# Patient Record
Sex: MALE | Race: WHITE | Employment: FULL TIME | URBAN - METROPOLITAN AREA
[De-identification: names, ages, dates, MRNs, and addresses within clinical notes are randomized per-mention and may not be internally consistent; named-entity substitution may affect disease eponyms.]

---

## 2020-02-10 ENCOUNTER — HOSPITAL ENCOUNTER (EMERGENCY)
Age: 25
Discharge: HOME | End: 2020-02-10
Attending: EMERGENCY MEDICINE
Payer: COMMERCIAL

## 2020-02-10 ENCOUNTER — APPOINTMENT (OUTPATIENT)
Dept: GENERAL RADIOLOGY | Age: 25
End: 2020-02-10
Payer: COMMERCIAL

## 2020-02-10 VITALS
TEMPERATURE: 98.1 F | WEIGHT: 178 LBS | RESPIRATION RATE: 16 BRPM | BODY MASS INDEX: 24.11 KG/M2 | DIASTOLIC BLOOD PRESSURE: 56 MMHG | HEART RATE: 52 BPM | HEIGHT: 72 IN | SYSTOLIC BLOOD PRESSURE: 100 MMHG | OXYGEN SATURATION: 99 %

## 2020-02-10 LAB
A/G RATIO: 1.8 (ref 1.1–2.2)
ALBUMIN SERPL-MCNC: 4.8 G/DL (ref 3.4–5)
ALP BLD-CCNC: 80 U/L (ref 40–129)
ALT SERPL-CCNC: 16 U/L (ref 10–40)
ANION GAP SERPL CALCULATED.3IONS-SCNC: 10 MMOL/L (ref 3–16)
AST SERPL-CCNC: 22 U/L (ref 15–37)
BASOPHILS ABSOLUTE: 0.1 K/UL (ref 0–0.2)
BASOPHILS RELATIVE PERCENT: 1.1 %
BILIRUB SERPL-MCNC: 0.8 MG/DL (ref 0–1)
BUN BLDV-MCNC: 13 MG/DL (ref 7–20)
CALCIUM SERPL-MCNC: 9.5 MG/DL (ref 8.3–10.6)
CHLORIDE BLD-SCNC: 103 MMOL/L (ref 99–110)
CO2: 27 MMOL/L (ref 21–32)
CREAT SERPL-MCNC: 0.8 MG/DL (ref 0.9–1.3)
EOSINOPHILS ABSOLUTE: 0.2 K/UL (ref 0–0.6)
EOSINOPHILS RELATIVE PERCENT: 2.1 %
GFR AFRICAN AMERICAN: >60
GFR NON-AFRICAN AMERICAN: >60
GLOBULIN: 2.6 G/DL
GLUCOSE BLD-MCNC: 112 MG/DL (ref 70–99)
HCT VFR BLD CALC: 48.1 % (ref 40.5–52.5)
HEMOGLOBIN: 16.6 G/DL (ref 13.5–17.5)
LYMPHOCYTES ABSOLUTE: 1.8 K/UL (ref 1–5.1)
LYMPHOCYTES RELATIVE PERCENT: 22.4 %
MAGNESIUM: 2.1 MG/DL (ref 1.8–2.4)
MCH RBC QN AUTO: 33.8 PG (ref 26–34)
MCHC RBC AUTO-ENTMCNC: 34.5 G/DL (ref 31–36)
MCV RBC AUTO: 97.9 FL (ref 80–100)
MONOCYTES ABSOLUTE: 0.7 K/UL (ref 0–1.3)
MONOCYTES RELATIVE PERCENT: 8.9 %
NEUTROPHILS ABSOLUTE: 5.1 K/UL (ref 1.7–7.7)
NEUTROPHILS RELATIVE PERCENT: 65.5 %
PDW BLD-RTO: 13.6 % (ref 12.4–15.4)
PLATELET # BLD: 199 K/UL (ref 135–450)
PMV BLD AUTO: 8.3 FL (ref 5–10.5)
POTASSIUM SERPL-SCNC: 4.6 MMOL/L (ref 3.5–5.1)
RBC # BLD: 4.91 M/UL (ref 4.2–5.9)
SODIUM BLD-SCNC: 140 MMOL/L (ref 136–145)
TOTAL PROTEIN: 7.4 G/DL (ref 6.4–8.2)
TROPONIN: <0.01 NG/ML
WBC # BLD: 7.8 K/UL (ref 4–11)

## 2020-02-10 PROCEDURE — 0296T PR EXT ECG > 48HR TO 21 DAY RCRD W/CONECT INTL RCRD: CPT | Performed by: INTERNAL MEDICINE

## 2020-02-10 PROCEDURE — 80053 COMPREHEN METABOLIC PANEL: CPT

## 2020-02-10 PROCEDURE — 71046 X-RAY EXAM CHEST 2 VIEWS: CPT

## 2020-02-10 PROCEDURE — 93005 ELECTROCARDIOGRAM TRACING: CPT | Performed by: EMERGENCY MEDICINE

## 2020-02-10 PROCEDURE — 85025 COMPLETE CBC W/AUTO DIFF WBC: CPT

## 2020-02-10 PROCEDURE — 83735 ASSAY OF MAGNESIUM: CPT

## 2020-02-10 PROCEDURE — 84484 ASSAY OF TROPONIN QUANT: CPT

## 2020-02-10 PROCEDURE — 99284 EMERGENCY DEPT VISIT MOD MDM: CPT

## 2020-02-10 ASSESSMENT — ENCOUNTER SYMPTOMS
VOMITING: 0
SORE THROAT: 0
ABDOMINAL PAIN: 0
SHORTNESS OF BREATH: 0
CONSTIPATION: 0
COUGH: 0
DIARRHEA: 0
BACK PAIN: 0
NAUSEA: 0

## 2020-02-10 NOTE — ED PROVIDER NOTES
breath. Cardiovascular: Positive for palpitations. Negative for chest pain. Gastrointestinal: Negative for abdominal pain, constipation, diarrhea, nausea and vomiting. Genitourinary: Negative for dysuria and hematuria. Musculoskeletal: Negative for back pain and neck pain. Skin: Negative for pallor and rash. Neurological: Positive for light-headedness. Negative for headaches. All other systems reviewed and are negative. Except as noted above the remainder of the review of systems was reviewed and negative. PAST MEDICAL HISTORY     Past Medical History:   Diagnosis Date    Asthma          SURGICAL HISTORY       Past Surgical History:   Procedure Laterality Date    FRACTURE SURGERY Right 09/09/2016    ORIF RIGHT HAND         CURRENT MEDICATIONS       There are no discharge medications for this patient. ALLERGIES     Patient has no known allergies. FAMILY HISTORY     History reviewed. No pertinent family history.        SOCIAL HISTORY       Social History     Socioeconomic History    Marital status: Single     Spouse name: None    Number of children: None    Years of education: None    Highest education level: None   Occupational History    None   Social Needs    Financial resource strain: None    Food insecurity:     Worry: None     Inability: None    Transportation needs:     Medical: None     Non-medical: None   Tobacco Use    Smoking status: Current Every Day Smoker     Packs/day: 0.50   Substance and Sexual Activity    Alcohol use: No    Drug use: Yes     Types: Marijuana    Sexual activity: None   Lifestyle    Physical activity:     Days per week: None     Minutes per session: None    Stress: None   Relationships    Social connections:     Talks on phone: None     Gets together: None     Attends Judaism service: None     Active member of club or organization: None     Attends meetings of clubs or organizations: None     Relationship status: None    Intimate partner violence:     Fear of current or ex partner: None     Emotionally abused: None     Physically abused: None     Forced sexual activity: None   Other Topics Concern    None   Social History Narrative    None       SCREENINGS               PHYSICAL EXAM    (up to 7 for level 4, 8 or more for level 5)     ED Triage Vitals [02/10/20 0837]   BP Temp Temp Source Pulse Resp SpO2 Height Weight   (!) 137/98 98.1 °F (36.7 °C) Oral 70 16 99 % 6' (1.829 m) 178 lb (80.7 kg)       Physical Exam  Vitals signs and nursing note reviewed. Constitutional:       General: He is not in acute distress. Appearance: Normal appearance. HENT:      Head: Normocephalic and atraumatic. Nose: Nose normal. No congestion. Mouth/Throat:      Mouth: Mucous membranes are moist.   Eyes:      Conjunctiva/sclera: Conjunctivae normal.   Neck:      Musculoskeletal: Normal range of motion. Cardiovascular:      Rate and Rhythm: Normal rate and regular rhythm. Pulses: Normal pulses. Heart sounds: Normal heart sounds. No murmur. Pulmonary:      Effort: Pulmonary effort is normal. No respiratory distress. Breath sounds: Normal breath sounds. Abdominal:      General: There is no distension. Palpations: Abdomen is soft. Tenderness: There is no abdominal tenderness. Musculoskeletal: Normal range of motion. General: No swelling or deformity. Skin:     General: Skin is warm and dry. Neurological:      General: No focal deficit present. Mental Status: He is alert and oriented to person, place, and time. DIAGNOSTIC RESULTS     EKG: All EKG's are interpreted by the Emergency Department Physician who either signs or Co-signs this chart in the absence of a cardiologist.    Sinus bradycardia, rate 53, normal intervals, no STEMI, delta wave present concerning for Konstantin-Parkinson-White, previous EKG from September 19, 2013 does not show delta wave.     RADIOLOGY:     Interpretation per the
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
## 2020-02-10 NOTE — ED NOTES
[facility] cardiology consult @0854  Re: black out episode, concern for WPW per [email or web address] 0798 Elke  02/10/20 113

## 2020-02-11 LAB
EKG ATRIAL RATE: 53 BPM
EKG DIAGNOSIS: NORMAL
EKG P AXIS: 55 DEGREES
EKG P-R INTERVAL: 136 MS
EKG Q-T INTERVAL: 406 MS
EKG QRS DURATION: 112 MS
EKG QTC CALCULATION (BAZETT): 380 MS
EKG R AXIS: 3 DEGREES
EKG T AXIS: 59 DEGREES
EKG VENTRICULAR RATE: 53 BPM

## 2020-02-11 PROCEDURE — 93010 ELECTROCARDIOGRAM REPORT: CPT | Performed by: INTERNAL MEDICINE

## 2020-02-17 ENCOUNTER — NURSE ONLY (OUTPATIENT)
Dept: CARDIOLOGY CLINIC | Age: 25
End: 2020-02-17

## 2020-03-04 PROCEDURE — 0298T PR EXT ECG > 48HR TO 21 DAY REVIEW AND INTERPRETATN: CPT | Performed by: INTERNAL MEDICINE

## 2020-03-05 ENCOUNTER — TELEPHONE (OUTPATIENT)
Dept: CARDIOLOGY CLINIC | Age: 25
End: 2020-03-05

## 2020-03-05 NOTE — TELEPHONE ENCOUNTER
2/10/20  Patient discussed with ER physician.  EKG and records reviewed     2 week monitor and follow up with me in office in 4 weeks     PEÑA Cheema Do you want him to make apt with you, Daryl Hoyos, or Maria Del Rosario Serrano

## 2020-04-06 ENCOUNTER — OFFICE VISIT (OUTPATIENT)
Dept: CARDIOLOGY CLINIC | Age: 25
End: 2020-04-06
Payer: COMMERCIAL

## 2020-04-06 VITALS
HEART RATE: 88 BPM | DIASTOLIC BLOOD PRESSURE: 62 MMHG | WEIGHT: 151.4 LBS | BODY MASS INDEX: 20.06 KG/M2 | SYSTOLIC BLOOD PRESSURE: 94 MMHG | OXYGEN SATURATION: 99 % | HEIGHT: 73 IN

## 2020-04-06 PROCEDURE — 99244 OFF/OP CNSLTJ NEW/EST MOD 40: CPT | Performed by: INTERNAL MEDICINE

## 2020-04-06 PROCEDURE — 93000 ELECTROCARDIOGRAM COMPLETE: CPT | Performed by: INTERNAL MEDICINE

## 2020-04-07 ENCOUNTER — TELEPHONE (OUTPATIENT)
Dept: CARDIOLOGY CLINIC | Age: 25
End: 2020-04-07

## 2020-04-07 NOTE — TELEPHONE ENCOUNTER
Monitor placed by [place]  Length of monitor 30 day  Monitor ordered by Ike La code: [id number]  Kit ID [id number]  Activation successful prior to pt leaving office?  YES

## 2020-05-19 PROCEDURE — 93268 ECG RECORD/REVIEW: CPT | Performed by: INTERNAL MEDICINE

## 2020-05-21 ENCOUNTER — TELEPHONE (OUTPATIENT)
Dept: CARDIOLOGY CLINIC | Age: 25
End: 2020-05-21

## 2020-06-03 ENCOUNTER — OFFICE VISIT (OUTPATIENT)
Dept: CARDIOLOGY CLINIC | Age: 25
End: 2020-06-03
Payer: COMMERCIAL

## 2020-06-03 VITALS
DIASTOLIC BLOOD PRESSURE: 78 MMHG | HEART RATE: 76 BPM | WEIGHT: 148 LBS | SYSTOLIC BLOOD PRESSURE: 112 MMHG | OXYGEN SATURATION: 98 % | BODY MASS INDEX: 19.53 KG/M2

## 2020-06-03 PROBLEM — I45.6 WPW SYNDROME: Status: ACTIVE | Noted: 2020-06-03

## 2020-06-03 PROBLEM — R00.2 PALPITATION: Status: ACTIVE | Noted: 2020-06-03

## 2020-06-03 PROCEDURE — 99214 OFFICE O/P EST MOD 30 MIN: CPT | Performed by: INTERNAL MEDICINE

## 2020-06-03 PROCEDURE — 93000 ELECTROCARDIOGRAM COMPLETE: CPT | Performed by: INTERNAL MEDICINE
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
## 2020-06-05 ENCOUNTER — TELEPHONE (OUTPATIENT)
Dept: CARDIOLOGY CLINIC | Age: 25
End: 2020-06-05

## 2020-06-30 ENCOUNTER — HOSPITAL ENCOUNTER (OUTPATIENT)
Dept: CARDIAC CATH/INVASIVE PROCEDURES | Age: 25
Discharge: HOME | End: 2020-06-30
Attending: INTERNAL MEDICINE
Payer: COMMERCIAL

## 2021-02-16 ENCOUNTER — HOSPITAL ENCOUNTER (EMERGENCY)
Age: 26
Discharge: HOME | End: 2021-02-17
Attending: EMERGENCY MEDICINE
Payer: COMMERCIAL

## 2021-02-16 ENCOUNTER — APPOINTMENT (OUTPATIENT)
Dept: CT IMAGING | Age: 26
End: 2021-02-16
Payer: COMMERCIAL

## 2021-02-16 ENCOUNTER — APPOINTMENT (OUTPATIENT)
Dept: GENERAL RADIOLOGY | Age: 26
End: 2021-02-16
Payer: COMMERCIAL

## 2021-02-16 DIAGNOSIS — F10.920 ACUTE ALCOHOLIC INTOXICATION WITHOUT COMPLICATION (HCC): Primary | ICD-10-CM
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
## 2021-02-16 DIAGNOSIS — R41.82 ALTERED MENTAL STATUS, UNSPECIFIED ALTERED MENTAL STATUS TYPE: ICD-10-CM

## 2021-02-16 LAB
A/G RATIO: 1.7 (ref 1.1–2.2)
ACETAMINOPHEN LEVEL: <5 UG/ML (ref 10–30)
ALBUMIN SERPL-MCNC: 4.7 G/DL (ref 3.4–5)
ALP BLD-CCNC: 79 U/L (ref 40–129)
ALT SERPL-CCNC: 14 U/L (ref 10–40)
AMPHETAMINE SCREEN, URINE: ABNORMAL
ANION GAP SERPL CALCULATED.3IONS-SCNC: 11 MMOL/L (ref 3–16)
AST SERPL-CCNC: 22 U/L (ref 15–37)
BARBITURATE SCREEN URINE: ABNORMAL
BASE EXCESS VENOUS: -5 MMOL/L (ref -3–3)
BASOPHILS ABSOLUTE: 0.1 K/UL (ref 0–0.2)
BASOPHILS RELATIVE PERCENT: 0.9 %
BENZODIAZEPINE SCREEN, URINE: ABNORMAL
BILIRUB SERPL-MCNC: 1 MG/DL (ref 0–1)
BILIRUBIN URINE: NEGATIVE
BLOOD, URINE: NEGATIVE
BUN BLDV-MCNC: 10 MG/DL (ref 7–20)
CALCIUM SERPL-MCNC: 8.8 MG/DL (ref 8.3–10.6)
CANNABINOID SCREEN URINE: POSITIVE
CARBOXYHEMOGLOBIN: 5.2 % (ref 0–1.5)
CHLORIDE BLD-SCNC: 100 MMOL/L (ref 99–110)
CLARITY: CLEAR
CO2: 24 MMOL/L (ref 21–32)
COCAINE METABOLITE SCREEN URINE: ABNORMAL
COLOR: YELLOW
CREAT SERPL-MCNC: 0.8 MG/DL (ref 0.9–1.3)
EOSINOPHILS ABSOLUTE: 0 K/UL (ref 0–0.6)
EOSINOPHILS RELATIVE PERCENT: 0.4 %
ETHANOL: 190 MG/DL (ref 0–0.08)
GFR AFRICAN AMERICAN: >60
GFR NON-AFRICAN AMERICAN: >60
GLOBULIN: 2.8 G/DL
GLUCOSE BLD-MCNC: 97 MG/DL (ref 70–99)
GLUCOSE URINE: NEGATIVE MG/DL
HCO3 VENOUS: 21.2 MMOL/L (ref 23–29)
HCT VFR BLD CALC: 45.6 % (ref 40.5–52.5)
HEMOGLOBIN: 15.8 G/DL (ref 13.5–17.5)
KETONES, URINE: NEGATIVE MG/DL
LACTIC ACID: 2 MMOL/L (ref 0.4–2)
LEUKOCYTE ESTERASE, URINE: NEGATIVE
LYMPHOCYTES ABSOLUTE: 1.3 K/UL (ref 1–5.1)
LYMPHOCYTES RELATIVE PERCENT: 14.1 %
Lab: ABNORMAL
MCH RBC QN AUTO: 33.4 PG (ref 26–34)
MCHC RBC AUTO-ENTMCNC: 34.6 G/DL (ref 31–36)
MCV RBC AUTO: 96.3 FL (ref 80–100)
METHADONE SCREEN, URINE: ABNORMAL
METHEMOGLOBIN VENOUS: 0.3 %
MICROSCOPIC EXAMINATION: NORMAL
MONOCYTES ABSOLUTE: 0.5 K/UL (ref 0–1.3)
MONOCYTES RELATIVE PERCENT: 5.7 %
NEUTROPHILS ABSOLUTE: 7.5 K/UL (ref 1.7–7.7)
NEUTROPHILS RELATIVE PERCENT: 78.9 %
NITRITE, URINE: NEGATIVE
O2 CONTENT, VEN: 21 VOL %
O2 SAT, VEN: 96 %
O2 THERAPY: ABNORMAL
OPIATE SCREEN URINE: ABNORMAL
OXYCODONE URINE: ABNORMAL
PCO2, VEN: 43 MMHG (ref 40–50)
PDW BLD-RTO: 13 % (ref 12.4–15.4)
PH UA: 5.5
PH UA: 5.5 (ref 5–8)
PH VENOUS: 7.31 (ref 7.35–7.45)
PHENCYCLIDINE SCREEN URINE: ABNORMAL
PLATELET # BLD: 183 K/UL (ref 135–450)
PMV BLD AUTO: 8 FL (ref 5–10.5)
PO2, VEN: 83.5 MMHG (ref 25–40)
POTASSIUM REFLEX MAGNESIUM: 3.6 MMOL/L (ref 3.5–5.1)
PROPOXYPHENE SCREEN: ABNORMAL
PROTEIN UA: NEGATIVE MG/DL
RBC # BLD: 4.73 M/UL (ref 4.2–5.9)
SALICYLATE, SERUM: <0.3 MG/DL (ref 15–30)
SODIUM BLD-SCNC: 135 MMOL/L (ref 136–145)
SPECIFIC GRAVITY UA: <=1.005 (ref 1–1.03)
SPECIMEN STATUS: NORMAL
TCO2 CALC VENOUS: 23 MMOL/L
TOTAL PROTEIN: 7.5 G/DL (ref 6.4–8.2)
URINE REFLEX TO CULTURE: NORMAL
URINE TYPE: NORMAL
UROBILINOGEN, URINE: 0.2 E.U./DL
WBC # BLD: 9.5 K/UL (ref 4–11)

## 2021-02-16 PROCEDURE — 80307 DRUG TEST PRSMV CHEM ANLYZR: CPT

## 2021-02-16 PROCEDURE — 83605 ASSAY OF LACTIC ACID: CPT

## 2021-02-16 PROCEDURE — 36415 COLL VENOUS BLD VENIPUNCTURE: CPT

## 2021-02-16 PROCEDURE — 99284 EMERGENCY DEPT VISIT MOD MDM: CPT

## 2021-02-16 PROCEDURE — 85025 COMPLETE CBC W/AUTO DIFF WBC: CPT

## 2021-02-16 PROCEDURE — 80053 COMPREHEN METABOLIC PANEL: CPT

## 2021-02-16 PROCEDURE — 81003 URINALYSIS AUTO W/O SCOPE: CPT

## 2021-02-16 PROCEDURE — 72170 X-RAY EXAM OF PELVIS: CPT

## 2021-02-16 PROCEDURE — 82803 BLOOD GASES ANY COMBINATION: CPT

## 2021-02-16 PROCEDURE — 83930 ASSAY OF BLOOD OSMOLALITY: CPT

## 2021-02-16 PROCEDURE — 80179 DRUG ASSAY SALICYLATE: CPT

## 2021-02-16 PROCEDURE — 93005 ELECTROCARDIOGRAM TRACING: CPT | Performed by: EMERGENCY MEDICINE

## 2021-02-16 PROCEDURE — 82077 ASSAY SPEC XCP UR&BREATH IA: CPT

## 2021-02-16 PROCEDURE — 71045 X-RAY EXAM CHEST 1 VIEW: CPT

## 2021-02-16 PROCEDURE — 72125 CT NECK SPINE W/O DYE: CPT

## 2021-02-16 PROCEDURE — 80143 DRUG ASSAY ACETAMINOPHEN: CPT

## 2021-02-16 PROCEDURE — 70450 CT HEAD/BRAIN W/O DYE: CPT

## 2021-02-16 ASSESSMENT — ENCOUNTER SYMPTOMS
NAUSEA: 0
VOMITING: 0
SHORTNESS OF BREATH: 0
ABDOMINAL PAIN: 0

## 2021-02-17 VITALS
TEMPERATURE: 98.4 F | DIASTOLIC BLOOD PRESSURE: 86 MMHG | OXYGEN SATURATION: 99 % | HEART RATE: 80 BPM | SYSTOLIC BLOOD PRESSURE: 115 MMHG | RESPIRATION RATE: 15 BRPM

## 2021-02-17 LAB
EKG ATRIAL RATE: 79 BPM
EKG DIAGNOSIS: NORMAL
EKG P AXIS: 50 DEGREES
EKG P-R INTERVAL: 164 MS
EKG Q-T INTERVAL: 382 MS
EKG QRS DURATION: 120 MS
EKG QTC CALCULATION (BAZETT): 438 MS
EKG R AXIS: 54 DEGREES
EKG T AXIS: 42 DEGREES
EKG VENTRICULAR RATE: 79 BPM
ETHANOL: 122 MG/DL (ref 0–0.08)
OSMOLALITY: 342 MOSM/KG (ref 275–295)
SARS-COV-2, NAAT: NOT DETECTED

## 2021-02-17 PROCEDURE — 82077 ASSAY SPEC XCP UR&BREATH IA: CPT

## 2021-02-17 PROCEDURE — 93010 ELECTROCARDIOGRAM REPORT: CPT | Performed by: INTERNAL MEDICINE

## 2021-02-17 RX ORDER — ACETAMINOPHEN, ASPIRIN AND CAFFEINE 250; 250; 65 MG/1; MG/1; MG/1
1 TABLET, FILM COATED ORAL EVERY 6 HOURS PRN
COMMUNITY

## 2021-02-17 RX ORDER — ALBUTEROL SULFATE 90 UG/1
AEROSOL, METERED RESPIRATORY (INHALATION)
COMMUNITY

## 2021-02-17 RX ORDER — SERTRALINE HYDROCHLORIDE 25 MG/1
TABLET, FILM COATED ORAL
COMMUNITY
Start: 2013-10-04

## 2021-02-17 NOTE — ED NOTES
Patient gave verbal permission for RN to talk with patients mother about his care, stating \"you can tell her anything\"     Gely Forde RN  02/16/21 3632

## 2021-02-17 NOTE — ED NOTES
Suicide precautions lifted by Dr. Koki Farrell at this time. Pt ambulated in the room with steady gait and no complaints. Pt given his belongings and instructed to get dressed. This tech discontinued safety precaution duties.       Ranjeet Ornelas  02/17/21 0221
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
## 2021-02-17 NOTE — ED NOTES
Patient left via personal vehicle to private residence. All belongings and paperwork went with him.       Jagdish Sinclair RN  02/17/21 7450
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
## 2021-02-17 NOTE — RESULT ENCOUNTER NOTE
Recommend patient follow-up with primary care as soon as possible, elevated osmolality, unclear etiology at this time but potentially related to alcohol abuse.

## 2021-02-18 LAB
EKG ATRIAL RATE: 82 BPM
EKG DIAGNOSIS: NORMAL
EKG P AXIS: 57 DEGREES
EKG P-R INTERVAL: 146 MS
EKG Q-T INTERVAL: 378 MS
EKG QRS DURATION: 108 MS
EKG QTC CALCULATION (BAZETT): 441 MS
EKG R AXIS: 77 DEGREES
EKG T AXIS: 66 DEGREES
EKG VENTRICULAR RATE: 82 BPM

## 2025-01-18 ENCOUNTER — HOSPITAL ENCOUNTER (EMERGENCY)
Age: 30
Discharge: HOME | End: 2025-01-18
Attending: STUDENT IN AN ORGANIZED HEALTH CARE EDUCATION/TRAINING PROGRAM

## 2025-01-18 VITALS
BODY MASS INDEX: 23.03 KG/M2 | HEART RATE: 58 BPM | WEIGHT: 170 LBS | SYSTOLIC BLOOD PRESSURE: 132 MMHG | TEMPERATURE: 98 F | DIASTOLIC BLOOD PRESSURE: 94 MMHG | OXYGEN SATURATION: 100 % | HEIGHT: 72 IN | RESPIRATION RATE: 16 BRPM

## 2025-01-18 DIAGNOSIS — K02.9 DENTAL CARIES: ICD-10-CM

## 2025-01-18 DIAGNOSIS — K08.89 PAIN, DENTAL: Primary | ICD-10-CM

## 2025-01-18 PROCEDURE — 99283 EMERGENCY DEPT VISIT LOW MDM: CPT

## 2025-01-18 PROCEDURE — 6370000000 HC RX 637 (ALT 250 FOR IP): Performed by: STUDENT IN AN ORGANIZED HEALTH CARE EDUCATION/TRAINING PROGRAM

## 2025-01-18 RX ORDER — OXYCODONE HYDROCHLORIDE 5 MG/1
5 TABLET ORAL ONCE
Status: COMPLETED | OUTPATIENT
Start: 2025-01-18 | End: 2025-01-18

## 2025-01-18 RX ORDER — IBUPROFEN 400 MG/1
400 TABLET, FILM COATED ORAL ONCE
Status: COMPLETED | OUTPATIENT
Start: 2025-01-18 | End: 2025-01-18

## 2025-01-18 RX ORDER — CHLORHEXIDINE GLUCONATE ORAL RINSE 1.2 MG/ML
15 SOLUTION DENTAL 3 TIMES DAILY
Qty: 118 ML | Refills: 0 | Status: SHIPPED | OUTPATIENT
Start: 2025-01-18 | End: 2025-02-01

## 2025-01-18 RX ORDER — LIDOCAINE HYDROCHLORIDE 20 MG/ML
15 SOLUTION OROPHARYNGEAL EVERY 4 HOURS PRN
Qty: 100 ML | Refills: 0 | Status: SHIPPED | OUTPATIENT
Start: 2025-01-18 | End: 2025-01-23

## 2025-01-18 RX ADMIN — IBUPROFEN 400 MG: 400 TABLET, FILM COATED ORAL at 05:08

## 2025-01-18 RX ADMIN — OXYCODONE HYDROCHLORIDE 5 MG: 5 TABLET ORAL at 05:09

## 2025-01-18 RX ADMIN — AMOXICILLIN AND CLAVULANATE POTASSIUM 1 TABLET: 875; 125 TABLET, FILM COATED ORAL at 05:09

## 2025-01-18 ASSESSMENT — PAIN DESCRIPTION - LOCATION
LOCATION: MOUTH
LOCATION: MOUTH

## 2025-01-18 ASSESSMENT — PAIN - FUNCTIONAL ASSESSMENT: PAIN_FUNCTIONAL_ASSESSMENT: 0-10

## 2025-01-18 ASSESSMENT — PAIN SCALES - GENERAL
PAINLEVEL_OUTOF10: 5
PAINLEVEL_OUTOF10: 10
PAINLEVEL_OUTOF10: 10

## 2025-01-18 NOTE — DISCHARGE INSTRUCTIONS
Dental Emergency Referrals    [facility] Department Clinics ([place] residents only)    [facility]  [address] (25)  [phone]   [facility]  [address]  [phone]   [facility]  [address]  [phone]   [facility]  (entrance on [facility]. [address]  [address]  [phone]   [facility]  [address]  [phone]   [facility]  [address]  [phone]       [place] Clinics offering Dental Services     [facility]  [address]  [phone]   [facility]  [address]  [phone]     [facility]  [address] 2nd floor  [phone]   [facility]  [address] (88)   [phone]     [facility]  [place]: [address]  [place]: [address]   [phone]   [facility]  [address]  [phone]     [facility]  [address]  [phone]   [place] [facility]  [address]  [phone]     [address]  Oral Surgery Dept: [phone]  Dental Clinic: [phone]   [facility]  [address]  [phone]     [facility]  [address] [phone]   [facility]   [address], [place] [address]  [place] : [phone]  [place] : [phone]     [facility]  [address]  [phone]    Other Dental Clinics in the area    [facility] (Dental Urgent Care)  [place]  (Across from [facility])  [address] [place] [address] [phone]?      Pediatric Only Dentists    [facility]   Up to age 21  [address]  [phone]    [facility]  Up to age 21  [place]:  [place]  [phone]    [facility]  Up to age 21  [place]: [address] [address]   [phone] or [phone]  [facility]: [address]  [phone]    [facility]  Up to age 16  [address] ([phone]

## 2025-01-18 NOTE — ED PROVIDER NOTES
[facility] EMERGENCY DEPARTMENT  EMERGENCY DEPARTMENT ENCOUNTER        Patient Name: Carlos Clark  MRN: [id number]  Birthdate 1995  Date of evaluation: 1/18/2025  Provider: Phoebe Garcia MD  PCP: Suzie Hernandez MD  Note Started: 6:40 AM EST 1/18/25      CHIEF COMPLAINT  Dental pain         HISTORY & PHYSICAL     HISTORY OF PRESENT ILLNESS  History from : Patient    Limitations to history : None    Carlos Clark is a 29 y.o. male  has a past medical history of Asthma., who presents to the ED complaining of dental pain.    Carlos Clark complains right lower teeth pain that started 1 month ago; the pain is Subacute. The pain is severe, aching, and does not radiate to the head.    Fever, eye pain, vision changes, sore throat, dysphagia, and odynophagia are absent.  Neck pain, swelling, and stiffness are absent.  Shortness of breath, chest pain, vomiting, numbness, and weakness are absent.      Old records reviewed: No pertinent information noted.    No other complaints, modifying factors or associated symptoms.  Nursing Notes were all reviewed and agreed with or any disagreements were addressed in the HPI.    I have reviewed the following from the nursing documentation.    Past Medical History:   Diagnosis Date    Asthma      Past Surgical History:   Procedure Laterality Date    FRACTURE SURGERY Right 09/09/2016    ORIF RIGHT HAND     No family history on file.  Social History     Socioeconomic History    Marital status: Single     Spouse name: Not on file    Number of children: Not on file    Years of education: Not on file    Highest education level: Not on file   Occupational History    Not on file   Tobacco Use    Smoking status: Every Day     Current packs/day: 0.25     Types: Cigarettes    Smokeless tobacco: Never   Substance and Sexual Activity    Alcohol use: No    Drug use: Yes     Types: Marijuana (Weed)    Sexual activity: Not on file   Other